# Patient Record
Sex: MALE | Race: WHITE | NOT HISPANIC OR LATINO | URBAN - METROPOLITAN AREA
[De-identification: names, ages, dates, MRNs, and addresses within clinical notes are randomized per-mention and may not be internally consistent; named-entity substitution may affect disease eponyms.]

---

## 2018-09-10 ENCOUNTER — CONVERSION ENCOUNTER (OUTPATIENT)
Dept: OTHER | Facility: HOSPITAL | Age: 73
End: 2018-09-10

## 2018-09-10 ENCOUNTER — OFFICE VISIT CONVERTED (OUTPATIENT)
Dept: OTHER | Facility: HOSPITAL | Age: 73
End: 2018-09-10
Attending: NURSE PRACTITIONER

## 2019-01-24 ENCOUNTER — CONVERSION ENCOUNTER (OUTPATIENT)
Dept: OTHER | Facility: HOSPITAL | Age: 74
End: 2019-01-24

## 2019-01-24 ENCOUNTER — OFFICE VISIT CONVERTED (OUTPATIENT)
Dept: OTHER | Facility: HOSPITAL | Age: 74
End: 2019-01-24
Attending: NURSE PRACTITIONER

## 2019-09-05 ENCOUNTER — CONVERSION ENCOUNTER (OUTPATIENT)
Dept: OTHER | Facility: HOSPITAL | Age: 74
End: 2019-09-05

## 2019-09-05 ENCOUNTER — OFFICE VISIT CONVERTED (OUTPATIENT)
Dept: OTHER | Facility: HOSPITAL | Age: 74
End: 2019-09-05
Attending: NURSE PRACTITIONER

## 2021-05-15 VITALS
WEIGHT: 287 LBS | SYSTOLIC BLOOD PRESSURE: 146 MMHG | HEIGHT: 69 IN | OXYGEN SATURATION: 95 % | BODY MASS INDEX: 42.51 KG/M2 | DIASTOLIC BLOOD PRESSURE: 85 MMHG | TEMPERATURE: 97.4 F | HEART RATE: 70 BPM | RESPIRATION RATE: 16 BRPM

## 2021-05-16 VITALS
BODY MASS INDEX: 41.05 KG/M2 | HEART RATE: 49 BPM | TEMPERATURE: 97.1 F | WEIGHT: 277.18 LBS | DIASTOLIC BLOOD PRESSURE: 77 MMHG | HEIGHT: 69 IN | OXYGEN SATURATION: 96 % | RESPIRATION RATE: 16 BRPM | SYSTOLIC BLOOD PRESSURE: 134 MMHG

## 2021-05-16 VITALS
BODY MASS INDEX: 43.69 KG/M2 | DIASTOLIC BLOOD PRESSURE: 80 MMHG | RESPIRATION RATE: 18 BRPM | HEIGHT: 69 IN | WEIGHT: 295 LBS | TEMPERATURE: 97.7 F | SYSTOLIC BLOOD PRESSURE: 138 MMHG | HEART RATE: 73 BPM | OXYGEN SATURATION: 97 %

## 2025-04-15 ENCOUNTER — OFFICE VISIT (OUTPATIENT)
Dept: SURGERY | Facility: CLINIC | Age: 80
End: 2025-04-15
Payer: MEDICARE

## 2025-04-15 VITALS
WEIGHT: 287 LBS | HEART RATE: 65 BPM | DIASTOLIC BLOOD PRESSURE: 88 MMHG | HEIGHT: 69 IN | BODY MASS INDEX: 42.51 KG/M2 | SYSTOLIC BLOOD PRESSURE: 130 MMHG

## 2025-04-15 DIAGNOSIS — D17.1 LIPOMA OF ABDOMINAL WALL: Primary | ICD-10-CM

## 2025-04-15 RX ORDER — PROPRANOLOL HYDROCHLORIDE 60 MG/1
TABLET ORAL
COMMUNITY

## 2025-04-15 RX ORDER — GINSENG 100 MG
50 CAPSULE ORAL
COMMUNITY

## 2025-04-15 RX ORDER — CYANOCOBALAMIN (VITAMIN B-12) 2500 MCG
TABLET, SUBLINGUAL SUBLINGUAL
COMMUNITY

## 2025-04-15 NOTE — PROGRESS NOTES
"Chief Complaint:   Inguinal hernia    Primary Care Provider: Sukh Sidhu APRN    Referring Provider: Referring, Self    History of Present Illness  Master Galvan is a 79 y.o. male referred by Self Referring evaluation for an inguinal hernia.  Patient has a small mass in the subtendinous tissue of his right groin area.  No pain or any problems at the area.  He is concerned it might be a hernia and wants me to take a look at it.  No imaging.    Allergies: Adhesive tape and Ranitidine    Outpatient Medications Marked as Taking for the 4/15/25 encounter (Office Visit) with Harris Ziegler MD   Medication Sig Dispense Refill    Apixaban (ELIQUIS PO) Take 5 mg by mouth 2 (Two) Times a Day.      Cholecalciferol (vitamin D3) 125 MCG (5000 UT) tablet tablet       LOSARTAN POTASSIUM-HCTZ PO       propranolol (INDERAL) 60 MG tablet       vitamin B-12 (CYANOCOBALAMIN) 2500 MCG sublingual tablet tablet       Zinc 50 MG tablet 1 tablet.         Past Medical History:    Bradycardia    Hypertension        Past Surgical History:    ANAL FISTULA REPAIR    CHOLECYSTECTOMY    HIP SURGERY    KNEE SURGERY    PACEMAKER IMPLANTATION       Family History:   Family History   Problem Relation Age of Onset    Hypertension Mother     Prostate cancer Father         Social History:  Social History     Tobacco Use    Smoking status: Never    Smokeless tobacco: Never   Substance Use Topics    Alcohol use: Yes     Comment: rare       Objective     Vital Signs:  /88   Pulse 65   Ht 175.3 cm (69\")   Wt 130 kg (287 lb)   BMI 42.38 kg/m²   Respiratory:  breathing not labored, respiratory effort appears normal  Cardiovascular:  heart regular rate  Abdomen:  soft, nontender, nondistended.  Lower abdominal wall pannus.  Soft, circumscribed, mobile subcutaneous mass at the right inguinal area with gross characteristics consistent with lipoma.  No detectable hernia.     Here alone    Assessment:  Lipoma of abdominal wall    Plan:  Lipoma " diagnosis discussed.  We talked about the option of surgical removal.  Patient is not having any problems from the lipoma.  He prefers to proceed with observation.  He will see me again if something changes      Harris Ziegler MD  04/15/2025    Electronically signed by Harris Ziegler MD, 04/15/25, 9:31 AM EDT.